# Patient Record
Sex: MALE | Race: WHITE | Employment: FULL TIME | URBAN - METROPOLITAN AREA
[De-identification: names, ages, dates, MRNs, and addresses within clinical notes are randomized per-mention and may not be internally consistent; named-entity substitution may affect disease eponyms.]

---

## 2017-05-10 PROBLEM — S46.911A RIGHT SHOULDER STRAIN: Status: ACTIVE | Noted: 2017-05-10

## 2017-05-19 NOTE — PROGRESS NOTES
Michael Alexander  : 1980 [facility] [address]  [phone]   [phone]       OUTPATIENT PHYSICAL THERAPY:Initial Assessment, Treatment Day: Day of Assessment and AM 2017    ICD-10: Treatment Diagnosis:    Pain in right shoulder (M25.511)    Bursitis of right shoulder (M75.51)  Precautions/Allergies:   Review of patient's allergies indicates no known allergies. Fall Risk Score: 1 (? 5 = High Risk)  MD Orders: Eval and Treat  MEDICAL/REFERRING DIAGNOSIS:  Right shoulder strain, initial encounter [Q35.531I]   DATE OF ONSET: 2 months ago or so  REFERRING PHYSICIAN: Emerald Thornton MD  RETURN PHYSICIAN APPOINTMENT: TBD by patient      INITIAL ASSESSMENT:  Mr. Michael Alexander has attended 1 physical therapy session including initial evaluation. Michael Alexander presents with S/S of R shoulder pain : the pain is located to top of the shoulder (supraspinatus) and posterior RTC (infraspinatus and teres minor). Pain is only with force such as with disc golf--no pain with isometrics or MMT. Consistent with S/S of mm strain. Michael Alexander will benefit from skilled PT (medically necessary) to address above deficits affecting participation in basic ADLs and overall functional tolerance. PROBLEM LIST (Impacting functional limitations):  1. Decreased Strength  2. Decreased ADL/Functional Activities  3. Decreased Transfer Abilities  4. Decreased Ambulation Ability/Technique  5. Decreased Balance  6. Increased Pain  7. Decreased Activity Tolerance  8. Increased Fatigue  9. Increased Shortness of Breath  10. Decreased Flexibility/Joint Mobility  11. Decreased [place] with Home Exercise Program INTERVENTIONS PLANNED:  1. Balance Exercise  2. Bed Mobility  3. Cold  4. Cryotherapy  5. Family Education  6. Gait Training  7. Home Exercise Program (HEP)  8. Manual Therapy  9. Neuromuscular Re-education/Strengthening  10.  Range of Motion (ROM)  11. Therapeutic Activites  12. Therapeutic Exercise/Strengthening  13. Transfer Training  14. Ultrasound   TREATMENT PLAN:  Effective Dates: 5/22/17 TO 8/22/17. Frequency/Duration: 2 times a week for 12 weeks  GOALS: (Goals have been discussed and agreed upon with patient.)  SHORT-TERM FUNCTIONAL GOALS: Time Frame: 4 weeks  1. Marycarmen Jefferson will be able to return to disc golf with no pain and no difficulty. 2. Marycarmen Jefferson will perform all ADLs without pain   3. Marycarmen Jefferson will improve DASH from 21 to 10 to shown improved participation in ADLs    Rehabilitation Potential For Stated Goals: Good  Regarding [facility] therapy, I certify that the treatment plan above will be carried out by a therapist or under their direction. Thank you for this referral,  Frank Ortega DPT     Referring Physician Signature: Paolo Feliz MD              Date                    HISTORY:   History of Present Injury/Illness (Reason for Referral):  \"I play disc golf and I play very often---A month and a half ago I have a little tingling in the shoulder and a month and ahalf ago it turned into a sharp pain. Every time I throw it hurts. Most things don't hurt, occasionally I get a twinge, but it really is hobby related. Past Medical History/Comorbidities:   Mr. Tressa Taylor  has a past medical history of Allergic rhinitis, cause unspecified. Mr. Tressa Taylor  has a past surgical history that includes refractive surgery.   Social History/Living Environment:       Social History     Social History    Marital status:      Spouse name: N/A    Number of children: N/A    Years of education: N/A     Occupational History    [employer]      Social History Main Topics    Smoking status: Never Smoker    Smokeless tobacco: Never Used    Alcohol use Yes      Comment: 2-3 beers a week    Drug use: No    Sexual activity: Not on file     Other Topics Concern    Caffeine Concern No    Special Diet No    Exercise Yes     3-4 /week disc golf    Seat Belt Yes     Social History Narrative     Prior Level of Function/Work/Activity:  Independent  Personal Factors:          Sex:  male        Age:  39 y.o. Current Medications:    Current Outpatient Prescriptions:     amoxicillin-clavulanate (AUGMENTIN) 875-125 mg per tablet, Take 1 Tab by mouth two (2) times a day., Disp: 20 Tab, Rfl: 0     Date Last Reviewed:  5/22/2017   Number of Personal Factors/Comorbidities that affect the Plan of Care: 0: LOW COMPLEXITY   EXAMINATION:   Observation/Orthostatic Postural Assessment:          Assessed @ Initial Visit: Rounded shoulders  Palpation:          Minimal tenderness with R external rotator cuff mm. ROM:    Assessed @ Initial Visit:  RIGHT UE AROM:  Shoulder flexion:  180  Shoulder abduction: 180  Shoulder IR: T4  Shoulder ER: T2  Elbow flexion: WFL  Elbow extension: WFL   LEFT UE AROM:  Shoulder flexion:  180  Shoulder abduction: 180  Shoulder IR: T4  Shoulder ER: T2  Elbow flexion: WFL  Elbow extension:  WFL       RIGHT UE PROM:  FULL ACTIVE ROM    Strength:     Assessed @ Initial Visit     RIGHT UE:  Shoulder flexion: 5/5  Shoulder abduction: 5/5  Shoulder IR: 5/5  Shoulder ER: 5/5  Elbow flexion: 5/5  Elbow extension: 5/5    Assessed @ Initial Visit     LEFT UE:  Shoulder flexion: 5/5  Shoulder abduction:5/5  Shoulder IR: 5/5  Shoulder ER: 5/5  Elbow flexion: 5/5  Elbow extension: 5/5         Special Tests:    Impingement tests:   Marlow-Manuelito test: Negative   Neer test: Negative   Horizontal adduction test: Negative  Glenoid labrum integrity tests:   [place] active compression test:  Negative  Biceps brachii tests:   Yergason's test: Negative      Speed's test: Negative  Neurological Screen: Assessed @ Initial Visit    Radiating symptoms? Yes/No ---None. Functional Mobility:  Assessed @ Initial Visit---limited ability to perform disc golf/throwing.   Balance:  Assessed @ Initial Visit --Unremarkable     Body Structures Involved:  1. Bones  2. Joints  3. Muscles  4. Ligaments Body Functions Affected:  1. Neuromusculoskeletal  2. Movement Related Activities and Participation Affected:  1. Mobility  2. Self Care   Number of elements that affect the Plan of Care: 4+: HIGH COMPLEXITY   CLINICAL PRESENTATION:   Presentation: Stable and uncomplicated: LOW COMPLEXITY   CLINICAL DECISION MAKING:   Outcome Measure: Tool Used: Disabilities of the Arm, Shoulder and Hand (DASH) Questionnaire - Quick Version  Score:  Initial: 21/55  Most Recent: X/55 (Date: -- )   Interpretation of Score: The DASH is designed to measure the activities of daily living in person's with upper extremity dysfunction or pain. Each section is scored on a 1-5 scale, 5 representing the greatest disability. The scores of each section are added together for a total score of 55. Score 11 12-19 20-28 29-37 38-45 46-54 55   Modifier CH CI CJ CK CL CM CN     ? Mobility - Walking and Moving Around:     - CURRENT STATUS: CJ - 20%-39% impaired, limited or restricted    - GOAL STATUS: CI - 1%-19% impaired, limited or restricted    - D/C STATUS:  ---------------To be determined---------------    Medical Necessity:   · Skilled intervention continues to be required due to deficits and impairments seen upon initial evaluation affecting patient's participation in ADLs and functional tasks. ·   Reason for Services/Other Comments:  · Patient continues to require skilled intervention due to deficits and impairments seen upon initial evaluation affecting patient's participation in ADLs and functional tasks.    Use of outcome tool(s) and clinical judgement create a POC that gives a: Clear prediction of patient's progress: LOW COMPLEXITY   TREATMENT:   (In addition to Assessment/Re-Assessment sessions the following treatments were rendered)  THERAPEUTIC EXERCISE: (10 MINUTES):  Exercises per grid below to improve mobility, strength and balance. Required minimal visual and verbal cues to promote proper body alignment, promote proper body posture and promote proper body mechanics. Progressed resistance, range and repetitions as indicated. Date:  5/22/17 Date:   Date:     Activity/Exercise Parameters Parameters Parameters   Rows 20 Blue     T's 20 Blue shoulder extension     IR/ER Blue 20     SL ER 5# 20     IR/ER at 90 Green 20                   MANUAL THERAPY: ( minutes): Joint mobilization, Soft tissue mobilization and Manipulation was utilized and necessary because of the patient's restricted joint motion, painful spasm and restricted motion of soft tissue. Done to improve soft tissue tone and elasticity  as well as promote proper joint movement in order to improve overall movement quality  (Used abbreviations: MET - muscle energy technique; PNF - proprioceptive neuromuscular facilitation; NMR - neuromuscular re-education; AP - anterior to posterior; PA - posterior to anterior)  MODALITIES: ( minutes):       Treatment/Session Assessment: Patient verbalized and demonstrated understanding of HEP. Shown exercises to work on strengthening---  · Pain/ Symptoms: Initial:   0/10 Post Session:  0/10 ·   Compliance with Program/Exercises: noncompliant some of the time. · Recommendations/Intent for next treatment session: \"Next visit will focus on advancements to more challenging activities\".   Total Treatment Duration:  PT Patient Time In/Time Out  Time In: 0845  Time Out: [address] Marry Hinson DPT

## 2017-05-22 ENCOUNTER — HOSPITAL ENCOUNTER (OUTPATIENT)
Dept: PHYSICAL THERAPY | Age: 37
Discharge: HOME | End: 2017-05-22
Attending: FAMILY MEDICINE
Payer: COMMERCIAL

## 2017-05-22 DIAGNOSIS — S46.911A RIGHT SHOULDER STRAIN, INITIAL ENCOUNTER: ICD-10-CM

## 2017-05-22 PROCEDURE — 97161 PT EVAL LOW COMPLEX 20 MIN: CPT

## 2017-05-22 PROCEDURE — 97110 THERAPEUTIC EXERCISES: CPT

## 2017-05-22 NOTE — PROGRESS NOTES
Ambulatory/Rehab Services H2 Model Falls Risk Assessment    Risk Factor Pts. ·   Confusion/Disorientation/Impulsivity  []    4 ·   Symptomatic Depression  []   2 ·   Altered Elimination  []   1 ·   Dizziness/Vertigo  []   1 ·   Gender (Male)  [x]   1 ·   Any administered antiepileptics (anticonvulsants):  []   2 ·   Any administered benzodiazepines:  []   1 ·   Visual Impairment (specify):  []   1 ·   Portable Oxygen Use  []   1 ·   Orthostatic ? BP  []   1 ·   History of Recent Falls (within 3 mos.)  []   5     Ability to Rise from Chair (choose one) Pts. ·   Ability to rise in a single movement  []   0 ·   Pushes up, successful in one attempt  []   1 ·   Multiple attempts, but successful  []   3 ·   Unable to rise without assistance  []   4   Total: (5 or greater = High Risk) 1     Falls Prevention Plan:   []                Physical Limitations to Exercise (specify):   []                Mobility Assistance Device (type):   []                Exercise/Equipment Adaptation (specify):    ©2010 [facility] [place] Patent #[id number].  Federal Law prohibits the replication, distribution or use without written permission from [facility] [employer]

## 2017-06-05 ENCOUNTER — APPOINTMENT (OUTPATIENT)
Dept: PHYSICAL THERAPY | Age: 37
End: 2017-06-05
Attending: FAMILY MEDICINE
Payer: COMMERCIAL

## 2017-06-09 ENCOUNTER — HOSPITAL ENCOUNTER (OUTPATIENT)
Dept: PHYSICAL THERAPY | Age: 37
Discharge: HOME | End: 2017-06-09
Attending: FAMILY MEDICINE
Payer: COMMERCIAL

## 2017-06-09 PROCEDURE — 97110 THERAPEUTIC EXERCISES: CPT

## 2017-06-09 PROCEDURE — 97140 MANUAL THERAPY 1/> REGIONS: CPT

## 2017-06-09 NOTE — PROGRESS NOTES
Mis Gramajo  : 1980 [facility] at [address]  [phone]   [phone]       OUTPATIENT PHYSICAL THERAPY:Daily Note, Treatment Day: 2nd and AM 2017    ICD-10: Treatment Diagnosis:    Pain in right shoulder (M25.511)    Bursitis of right shoulder (M75.51)  Precautions/Allergies:   Review of patient's allergies indicates no known allergies. Fall Risk Score: 1 (? 5 = High Risk)  MD Orders: Eval and Treat  MEDICAL/REFERRING DIAGNOSIS:  Right shoulder strain [W14.924Z]   DATE OF ONSET: 2 months ago or so  REFERRING PHYSICIAN: Manuela Singletary MD  RETURN PHYSICIAN APPOINTMENT: TBD by patient      INITIAL ASSESSMENT:  Mr. Mis Gramajo has attended 2 physical therapy sessions including initial evaluation. Mis Gramajo presents with S/S of R shoulder pain : the pain is located to top of the shoulder (supraspinatus) and posterior RTC (infraspinatus and teres minor). Pain is only with force such as with disc golf--no pain with isometrics or MMT. Consistent with S/S of mm strain. Mis Gramajo will benefit from skilled PT (medically necessary) to address above deficits affecting participation in basic ADLs and overall functional tolerance. PROBLEM LIST (Impacting functional limitations):  1. Decreased Strength  2. Decreased ADL/Functional Activities  3. Decreased Transfer Abilities  4. Decreased Ambulation Ability/Technique  5. Decreased Balance  6. Increased Pain  7. Decreased Activity Tolerance  8. Increased Fatigue  9. Increased Shortness of Breath  10. Decreased Flexibility/Joint Mobility  11. Decreased [place] with Home Exercise Program INTERVENTIONS PLANNED:  1. Balance Exercise  2. Bed Mobility  3. Cold  4. Cryotherapy  5. Family Education  6. Gait Training  7. Home Exercise Program (HEP)  8. Manual Therapy  9. Neuromuscular Re-education/Strengthening  10. Range of Motion (ROM)  11.  Therapeutic Activites  12. Therapeutic Exercise/Strengthening  13. Transfer Training  14. Ultrasound   TREATMENT PLAN:  Effective Dates: 5/22/17 TO 8/22/17. Frequency/Duration: 2 times a week for 12 weeks  GOALS: (Goals have been discussed and agreed upon with patient.)  SHORT-TERM FUNCTIONAL GOALS: Time Frame: 4 weeks  1. Joss Herr will be able to return to disc golf with no pain and no difficulty. 2. Joss Herr will perform all ADLs without pain   3. Joss Herr will improve DASH from 21 to 10 to shown improved participation in ADLs    Rehabilitation Potential For Stated Goals: Good            HISTORY:   History of Present Injury/Illness (Reason for Referral):  \"I play disc golf and I play very often---A month and a half ago I have a little tingling in the shoulder and a month and ahalf ago it turned into a sharp pain. Every time I throw it hurts. Most things don't hurt, occasionally I get a twinge, but it really is hobby related. Past Medical History/Comorbidities:   Mr. Kalina Rose  has a past medical history of Allergic rhinitis, cause unspecified. Mr. Kalina Rose  has a past surgical history that includes refractive surgery. Social History/Living Environment:       Social History     Social History    Marital status:      Spouse name: N/A    Number of children: N/A    Years of education: N/A     Occupational History    [employer]      Social History Main Topics    Smoking status: Never Smoker    Smokeless tobacco: Never Used    Alcohol use Yes      Comment: 2-3 beers a week    Drug use: No    Sexual activity: Not on file     Other Topics Concern    Caffeine Concern No    Special Diet No    Exercise Yes     3-4 /week disc golf    Seat Belt Yes     Social History Narrative     Prior Level of Function/Work/Activity:  Independent  Personal Factors:          Sex:  male        Age:  39 y.o.   Current Medications:    Current Outpatient Prescriptions:    amoxicillin-clavulanate (AUGMENTIN) 875-125 mg per tablet, Take 1 Tab by mouth two (2) times a day., Disp: 20 Tab, Rfl: 0     Date Last Reviewed:  6/9/2017   Number of Personal Factors/Comorbidities that affect the Plan of Care: 0: LOW COMPLEXITY   EXAMINATION:   Observation/Orthostatic Postural Assessment:          Assessed @ Initial Visit: Rounded shoulders  Palpation:          Minimal tenderness with R external rotator cuff mm. ROM:    Assessed @ Initial Visit:  RIGHT UE AROM:  Shoulder flexion:  180  Shoulder abduction: 180  Shoulder IR: T4  Shoulder ER: T2  Elbow flexion: WFL  Elbow extension: WFL   LEFT UE AROM:  Shoulder flexion:  180  Shoulder abduction: 180  Shoulder IR: T4  Shoulder ER: T2  Elbow flexion: WFL  Elbow extension:  WFL       RIGHT UE PROM:  FULL ACTIVE ROM    Strength:     Assessed @ Initial Visit     RIGHT UE:  Shoulder flexion: 5/5  Shoulder abduction: 5/5  Shoulder IR: 5/5  Shoulder ER: 5/5  Elbow flexion: 5/5  Elbow extension: 5/5    Assessed @ Initial Visit     LEFT UE:  Shoulder flexion: 5/5  Shoulder abduction:5/5  Shoulder IR: 5/5  Shoulder ER: 5/5  Elbow flexion: 5/5  Elbow extension: 5/5         Special Tests:    Impingement tests:   Marlow-Manuelito test: Negative   Neer test: Negative   Horizontal adduction test: Negative  Glenoid labrum integrity tests:   [place] active compression test:  Negative  Biceps brachii tests:   Yergason's test: Negative      Speed's test: Negative  Neurological Screen: Assessed @ Initial Visit    Radiating symptoms? Yes/No ---None. Functional Mobility:  Assessed @ Initial Visit---limited ability to perform disc golf/throwing. Balance:  Assessed @ Initial Visit --Unremarkable     Body Structures Involved:  1. Bones  2. Joints  3. Muscles  4. Ligaments Body Functions Affected:  1. Neuromusculoskeletal  2. Movement Related Activities and Participation Affected:  1. Mobility  2.  Self Care   Number of elements that affect the Plan of Care: 4+: HIGH COMPLEXITY   CLINICAL PRESENTATION:   Presentation: Stable and uncomplicated: LOW COMPLEXITY   CLINICAL DECISION MAKING:   Outcome Measure: Tool Used: Disabilities of the Arm, Shoulder and Hand (DASH) Questionnaire - Quick Version  Score:  Initial: 21/55     Interpretation of Score: The DASH is designed to measure the activities of daily living in person's with upper extremity dysfunction or pain. Each section is scored on a 1-5 scale, 5 representing the greatest disability. The scores of each section are added together for a total score of 55. Score 11 12-19 20-28 29-37 38-45 46-54 55   Modifier CH CI CJ CK CL CM CN     ? Mobility - Walking and Moving Around:     - CURRENT STATUS: CJ - 20%-39% impaired, limited or restricted    - GOAL STATUS: CI - 1%-19% impaired, limited or restricted    - D/C STATUS:  ---------------To be determined---------------    Medical Necessity:   · Skilled intervention continues to be required due to deficits and impairments seen upon initial evaluation affecting patient's participation in ADLs and functional tasks. ·   Reason for Services/Other Comments:  · Patient continues to require skilled intervention due to deficits and impairments seen upon initial evaluation affecting patient's participation in ADLs and functional tasks. Use of outcome tool(s) and clinical judgement create a POC that gives a: Clear prediction of patient's progress: LOW COMPLEXITY   TREATMENT:   PreTreatment Symptoms: Liliana Pitt notes improving pain from sharp to achy when throwing Frisbee    (In addition to Assessment/Re-Assessment sessions the following treatments were rendered)  THERAPEUTIC EXERCISE: (27 MINUTES):  Exercises per grid below to improve mobility, strength and balance. Required minimal visual and verbal cues to promote proper body alignment, promote proper body posture and promote proper body mechanics. Progressed resistance, range and repetitions as indicated. Date:  5/22/17 Date:  6/9/17 Date:     Activity/Exercise Parameters Parameters Parameters   Rows 20 Blue     T's 20 Blue shoulder extension In prone 20 repetitions     IR/ER Blue 20     SL ER 5# 20     IR/ER at 80 Green 20     Y's  In prone 20 repetitions     O's  In prone 20 repetitions     Supine serratus anterior strengthening   With blue theraband and push plus 20 repetitions bilaterally    Serratus anterior strengthening in modified push up position   Protraction and retraction scapulas for improved strength     posterior and inferior capsule stretch for improved mobility   5 second hold 10 repetitions each       Manual Therapy (     12 minutes):  Posterior and inferior glenohumeral joint glide right for improved mobility and decreased pain   Passive stretch/passive range of motion right shoulder external rotation     Therapeutic Modalities:                                                                                                   Treatment/Session Assessment: Jia Blank notes understanding techniques to decrease impingement right shoulder with daily activities and throwing frisbee  · Pain/ Symptoms: Initial:   1/10 Post Session:  0/10 ·   Compliance with Program/Exercises: noncompliant some of the time. · Recommendations/Intent for next treatment session: \"Next visit will focus on advancements to more challenging activities\".   Total Treatment Duration:  PT Patient Time In/Time Out  Time In: 0800  Time Out: [address], PT

## 2017-06-12 ENCOUNTER — HOSPITAL ENCOUNTER (OUTPATIENT)
Dept: PHYSICAL THERAPY | Age: 37
End: 2017-06-12
Attending: FAMILY MEDICINE
Payer: COMMERCIAL

## 2017-06-19 ENCOUNTER — APPOINTMENT (OUTPATIENT)
Dept: PHYSICAL THERAPY | Age: 37
End: 2017-06-19
Attending: FAMILY MEDICINE
Payer: COMMERCIAL

## 2017-08-23 NOTE — PROGRESS NOTES
Michelle Jeffery  : 1980 [facility] [address]  [phone]   [phone]       OUTPATIENT PHYSICAL THERAPY:Discontinuation Summary 2017    ICD-10: Treatment Diagnosis:    Pain in right shoulder (M25.511)    Bursitis of right shoulder (M75.51)  Precautions/Allergies:   Review of patient's allergies indicates no known allergies. Fall Risk Score: 1 (? 5 = High Risk)  MD Orders: Eval and Treat  MEDICAL/REFERRING DIAGNOSIS:  Right shoulder strain [U16.015U]   DATE OF ONSET: 2 months ago or so  REFERRING PHYSICIAN: Isauro Amaya MD  RETURN PHYSICIAN APPOINTMENT: TBD by patient      INITIAL ASSESSMENT:  Mr. Michelle Jeffery has attended 2 physical therapy sessions including initial evaluation. Michelle Jeffery to be discontinued from physical therapy due to not attending since 17. He participated in therapeutic exercises and manual therapy as needed to help reduce symptoms. PROBLEM LIST (Impacting functional limitations):  1. Decreased Strength  2. Decreased ADL/Functional Activities  3. Decreased Transfer Abilities  4. Decreased Ambulation Ability/Technique  5. Decreased Balance  6. Increased Pain  7. Decreased Activity Tolerance  8. Increased Fatigue  9. Increased Shortness of Breath  10. Decreased Flexibility/Joint Mobility  11. Decreased [place] with Home Exercise Program INTERVENTIONS PLANNED:  1. Balance Exercise  2. Bed Mobility  3. Cold  4. Cryotherapy  5. Family Education  6. Gait Training  7. Home Exercise Program (HEP)  8. Manual Therapy  9. Neuromuscular Re-education/Strengthening  10. Range of Motion (ROM)  11. Therapeutic Activites  12. Therapeutic Exercise/Strengthening  13. Transfer Training  14. Ultrasound   TREATMENT PLAN:  Effective Dates: 17 TO 17. Frequency/Duration:   GOALS: (Goals have been discussed and agreed upon with patient.)  SHORT-TERM FUNCTIONAL GOALS: Time Frame: 4 weeks  1. Faraz Noe will be able to return to disc golf with no pain and no difficulty. (NOT MET due to NOT ASSESSED 8/23/17)  2. Faraz Noe will perform all ADLs without pain (NOT MET due to NOT ASSESSED 8/23/17)  3. Faraz Noe will improve DASH from 21 to 10 to shown improved participation in ADLs (NOT MET due to NOT ASSESSED 8/23/17)    Rehabilitation Potential For Stated Goals: Good            HISTORY:   History of Present Injury/Illness (Reason for Referral):  \"I play disc golf and I play very often---A month and a half ago I have a little tingling in the shoulder and a month and ahalf ago it turned into a sharp pain. Every time I throw it hurts. Most things don't hurt, occasionally I get a twinge, but it really is hobby related. Past Medical History/Comorbidities:   Mr. Ann Marie Ling  has a past medical history of Allergic rhinitis, cause unspecified. Mr. Ann Marie Ling  has a past surgical history that includes refractive surgery. Social History/Living Environment:       Social History     Social History    Marital status:      Spouse name: N/A    Number of children: N/A    Years of education: N/A     Occupational History    [employer] - sells and services      Social History Main Topics    Smoking status: Never Smoker    Smokeless tobacco: Never Used    Alcohol use Yes      Comment: 2-3 beers a week    Drug use: No    Sexual activity: Not on file     Other Topics Concern    Caffeine Concern No    Special Diet No    Exercise Yes     3-4 /week disc golf    Seat Belt Yes     Social History Narrative     Prior Level of Function/Work/Activity:  Independent  Personal Factors:          Sex:  male        Age:  40 y.o.   Current Medications:    Current Outpatient Prescriptions:     amoxicillin-clavulanate (AUGMENTIN) 875-125 mg per tablet, Take 1 Tab by mouth two (2) times a day., Disp: 20 Tab, Rfl: 0     Date Last Reviewed:  8/23/2017   Number of Personal Factors/Comorbidities that affect the Plan of Care: 0: LOW COMPLEXITY   EXAMINATION:   Observation/Orthostatic Postural Assessment:          Assessed @ Initial Visit: Rounded shoulders  Palpation:          Minimal tenderness with R external rotator cuff mm. ROM:    Assessed @ Initial Visit:  RIGHT UE AROM:  Shoulder flexion:  180  Shoulder abduction: 180  Shoulder IR: T4  Shoulder ER: T2  Elbow flexion: WFL  Elbow extension: WFL   LEFT UE AROM:  Shoulder flexion:  180  Shoulder abduction: 180  Shoulder IR: T4  Shoulder ER: T2  Elbow flexion: WFL  Elbow extension:  WFL       RIGHT UE PROM:  FULL ACTIVE ROM    Strength:     Assessed @ Initial Visit     RIGHT UE:  Shoulder flexion: 5/5  Shoulder abduction: 5/5  Shoulder IR: 5/5  Shoulder ER: 5/5  Elbow flexion: 5/5  Elbow extension: 5/5    Assessed @ Initial Visit     LEFT UE:  Shoulder flexion: 5/5  Shoulder abduction:5/5  Shoulder IR: 5/5  Shoulder ER: 5/5  Elbow flexion: 5/5  Elbow extension: 5/5         Special Tests:    Impingement tests:   Marlow-Manuelito test: Negative   Neer test: Negative   Horizontal adduction test: Negative  Glenoid labrum integrity tests:   Zortman active compression test:  Negative  Biceps brachii tests:   Yergason's test: Negative      Speed's test: Negative  Neurological Screen: Assessed @ Initial Visit    Radiating symptoms? Yes/No ---None. Functional Mobility:  Assessed @ Initial Visit---limited ability to perform disc golf/throwing. Balance:  Assessed @ Initial Visit --Unremarkable     Body Structures Involved:  1. Bones  2. Joints  3. Muscles  4. Ligaments Body Functions Affected:  1. Neuromusculoskeletal  2. Movement Related Activities and Participation Affected:  1. Mobility  2. Self Care   Number of elements that affect the Plan of Care: 4+: HIGH COMPLEXITY   CLINICAL PRESENTATION:   Presentation: Stable and uncomplicated: LOW COMPLEXITY   CLINICAL DECISION MAKING:   Outcome Measure:    Tool Used: Disabilities of the Arm, Shoulder and Hand (DASH) Questionnaire - Quick Version  Score:  Initial: 21/55     Interpretation of Score: The DASH is designed to measure the activities of daily living in person's with upper extremity dysfunction or pain. Each section is scored on a 1-5 scale, 5 representing the greatest disability. The scores of each section are added together for a total score of 55. Score 11 12-19 20-28 29-37 38-45 46-54 55   Modifier CH CI CJ CK CL CM CN     ?  Mobility - Walking and Moving Around:     - CURRENT STATUS: CJ - 20%-39% impaired, limited or restricted    - GOAL STATUS: CI - 1%-19% impaired, limited or restricted    - D/C STATUS:  ---------------To be determined---------------     Use of outcome tool(s) and clinical judgement create a POC that gives a: Clear prediction of patient's progress: LOW COMPLEXITY   TREATMENT:     Eduard Acevedo, PT